# Patient Record
Sex: MALE | Race: BLACK OR AFRICAN AMERICAN | Employment: FULL TIME | URBAN - METROPOLITAN AREA
[De-identification: names, ages, dates, MRNs, and addresses within clinical notes are randomized per-mention and may not be internally consistent; named-entity substitution may affect disease eponyms.]

---

## 2017-07-21 ENCOUNTER — ED HISTORICAL/CONVERTED ENCOUNTER (OUTPATIENT)
Dept: OTHER | Age: 37
End: 2017-07-21

## 2017-11-21 ENCOUNTER — ED HISTORICAL/CONVERTED ENCOUNTER (OUTPATIENT)
Dept: OTHER | Age: 37
End: 2017-11-21

## 2018-03-17 ENCOUNTER — ED HISTORICAL/CONVERTED ENCOUNTER (OUTPATIENT)
Dept: OTHER | Age: 38
End: 2018-03-17

## 2018-10-31 ENCOUNTER — ED HISTORICAL/CONVERTED ENCOUNTER (OUTPATIENT)
Dept: OTHER | Age: 38
End: 2018-10-31

## 2019-02-11 ENCOUNTER — ED HISTORICAL/CONVERTED ENCOUNTER (OUTPATIENT)
Dept: OTHER | Age: 39
End: 2019-02-11

## 2019-04-22 ENCOUNTER — ED HISTORICAL/CONVERTED ENCOUNTER (OUTPATIENT)
Dept: OTHER | Age: 39
End: 2019-04-22

## 2020-05-07 ENCOUNTER — ED HISTORICAL/CONVERTED ENCOUNTER (OUTPATIENT)
Dept: OTHER | Age: 40
End: 2020-05-07

## 2021-08-24 ENCOUNTER — HOSPITAL ENCOUNTER (EMERGENCY)
Age: 41
Discharge: LEFT BEFORE DISCHARGE | End: 2021-08-24

## 2021-08-24 VITALS
TEMPERATURE: 97.7 F | OXYGEN SATURATION: 100 % | WEIGHT: 170 LBS | SYSTOLIC BLOOD PRESSURE: 108 MMHG | HEIGHT: 72 IN | HEART RATE: 76 BPM | BODY MASS INDEX: 23.03 KG/M2 | DIASTOLIC BLOOD PRESSURE: 73 MMHG | RESPIRATION RATE: 18 BRPM

## 2021-08-24 PROCEDURE — 75810000275 HC EMERGENCY DEPT VISIT NO LEVEL OF CARE

## 2021-08-24 NOTE — ED TRIAGE NOTES
X 30 days, has stye to left eye lid, 7/10 discomfort, not scratching but is draining for 30 days, talked to nurse was told to put warm compress on it. No crust noted. No other comlaints and used clear eye drops, helped, just because of swelling hard to see.

## 2022-01-23 ENCOUNTER — HOSPITAL ENCOUNTER (EMERGENCY)
Age: 42
Discharge: LEFT BEFORE DISCHARGE | End: 2022-01-23

## 2022-01-23 VITALS
SYSTOLIC BLOOD PRESSURE: 107 MMHG | RESPIRATION RATE: 16 BRPM | HEIGHT: 72 IN | HEART RATE: 60 BPM | OXYGEN SATURATION: 98 % | TEMPERATURE: 97.4 F | DIASTOLIC BLOOD PRESSURE: 71 MMHG | BODY MASS INDEX: 23.7 KG/M2 | WEIGHT: 175 LBS

## 2022-01-23 PROCEDURE — 75810000275 HC EMERGENCY DEPT VISIT NO LEVEL OF CARE

## 2022-01-23 NOTE — ED TRIAGE NOTES
GCS 15 pt stated that he has stye to his left upper eye lid; pt stated that he has had it for the last 3 days; pt also stated that recently he has been having a cough and body aches

## 2022-09-12 ENCOUNTER — HOSPITAL ENCOUNTER (EMERGENCY)
Age: 42
Discharge: HOME | End: 2022-09-12
Attending: STUDENT IN AN ORGANIZED HEALTH CARE EDUCATION/TRAINING PROGRAM

## 2022-09-12 VITALS
SYSTOLIC BLOOD PRESSURE: 117 MMHG | WEIGHT: 173 LBS | RESPIRATION RATE: 19 BRPM | BODY MASS INDEX: 23.43 KG/M2 | DIASTOLIC BLOOD PRESSURE: 76 MMHG | HEIGHT: 72 IN | HEART RATE: 74 BPM | OXYGEN SATURATION: 98 % | TEMPERATURE: 98.5 F

## 2022-09-12 DIAGNOSIS — H00.019 HORDEOLUM EXTERNUM, UNSPECIFIED LATERALITY: Primary | ICD-10-CM

## 2022-09-12 PROCEDURE — 99282 EMERGENCY DEPT VISIT SF MDM: CPT

## 2022-09-12 NOTE — ED PROVIDER NOTES
[facility]  EMERGENCY DEPARTMENT ENCOUNTER NOTE        Date: 9/12/2022  Patient Name: Christian Lemus      History of Presenting Illness     Chief Complaint   Patient presents with    Eye Pain       History Provided By: Patient    HPI: Cinthya Sharpe, 37 y.o. male with no chronic PMH of note who presents to the ED with several days history of stye on the left eye. Constant, nothing specific makes it better or worse tried using warm compressions twice a day for the last couple of days without significant provement. Mild irritation without any changes in vision. He did have something similar on the left eye this been going on for 9 months. He has not followed up with ophthalmology to get this issue addressed. Hogan, patient reports that he has been his normal self and does not have any complaints. Presenting issue is of mild severity, no known aggravating leaving factors no association symptoms. There are no other complaints, changes, or physical findings at this time. PCP: None        Past History     Past Medical History:  History reviewed. No pertinent past medical history. Past Surgical History:  History reviewed. No pertinent surgical history. Family History:  History reviewed. No pertinent family history. Social History:  Social History     Tobacco Use    Smoking status: Some Days     Packs/day: 0.25     Types: Cigarettes    Smokeless tobacco: Never   Vaping Use    Vaping Use: Never used   Substance Use Topics    Alcohol use: Yes     Comment: on occasion     Drug use: Not Currently       Allergies:  No Known Allergies      Review of Systems     Review of Systems    A 10 point review of system was performed and was negative except as noted above in HPI    Physical Exam     Physical Exam  Vitals and nursing note reviewed. Constitutional:       General: He is not in acute distress. Appearance: He is not toxic-appearing.    HENT:      Head: Normocephalic and atraumatic. Eyes:      General: No scleral icterus. Right eye: No discharge. Left eye: No discharge. Extraocular Movements: Extraocular movements intact. Conjunctiva/sclera: Conjunctivae normal.      Comments: Stye noted on the left upper eyelid  Stye noted on the right lower eyelid   Cardiovascular:      Rate and Rhythm: Normal rate and regular rhythm. Pulmonary:      Effort: Pulmonary effort is normal. No respiratory distress. Musculoskeletal:         General: No deformity or signs of injury. Neurological:      Mental Status: He is alert and oriented to person, place, and time. Psychiatric:         Mood and Affect: Mood normal.         Behavior: Behavior normal.       Lab and Diagnostic Study Results     Labs -   No results found for this or any previous visit (from the past 12 hour(s)). Radiologic Studies -   [unfilled]  CT Results  (Last 48 hours)      None          CXR Results  (Last 48 hours)      None            Medical Decision Making and ED Course   - I am the first and primary provider for this patient AND AM THE PRIMARY PROVIDER OF RECORD. - I reviewed the vital signs, available nursing notes, past medical history, past surgical history, family history and social history. - Initial assessment performed. The patients presenting problems have been discussed, and the staff are in agreement with the care plan formulated and outlined with them. I have encouraged them to ask questions as they arise throughout their visit. Vital Signs-Reviewed the patient's vital signs. Patient Vitals for the past 24 hrs:   Temp Pulse Resp BP SpO2   09/12/22 1248 98.5 °F (36.9 °C) 74 19 117/76 98 %       Records Reviewed: Nursing Notes    Provider Notes (Medical Decision Making):     81-year-old gentleman with no chronic PMH of note presents to the ED with chronic stye of the left and acute stye on the right eye.   Vision is within normal.  Conjunctive is within normal.  No drainage or discharge noted. No periorbital swelling that would be concerning for an infectious process. Normal extraocular movement as well. Symptomatic treatment with warm compressions and NSAIDs as needed. Follow-up with ophthalmology as appropriate. ED evaluation, clinical impression disposition discussed with patient and he verbalized understanding. He will be able to arrange necessary follow-up. Instructed come back to the ED if symptoms evolve or develop new concerns. Diagnosis     Clinical Impression:   1. Hordeolum externum, unspecified laterality        Disposition     Disposition: Condition stable  DC- Adult Discharges: All of the diagnostic tests were reviewed and questions answered. Diagnosis, care plan and treatment options were discussed. The patient understands the instructions and will follow up as directed. The patients results have been reviewed with them. They have been counseled regarding their diagnosis. The patient verbally convey understanding and agreement of the signs, symptoms, diagnosis, treatment and prognosis and additionally agrees to follow up as recommended with their PCP in 24 - 48 hours. They also agree with the care-plan and convey that all of their questions have been answered. I have also put together some discharge instructions for them that include: 1) educational information regarding their diagnosis, 2) how to care for their diagnosis at home, as well a 3) list of reasons why they would want to return to the ED prior to their follow-up appointment, should their condition change. Discharged      DISCHARGE PLAN:  1.    Follow-up Information       Follow up With Specialties Details Why Dasia Lisayuliana Finch  Schedule an appointment as soon as possible for a visit in 3 days For reevaluation, Discuss your visit to the ER [address] [place] [address] [address]    [address] [place] [address] DEPT Emergency Medicine Go to  As needed, If symptoms worsen [address] [address] [address]  [phone]          2. Return to ED if worse   3. There are no discharge medications for this patient. Attestations: Lauren Daugherty MD    Please note that this dictation was completed with [employer], the computer voice recognition software. Quite often unanticipated grammatical, syntax, homophones, and other interpretive errors are inadvertently transcribed by the computer software. Please disregard these errors. Please excuse any errors that have escaped final proofreading. Thank you.

## 2022-09-12 NOTE — ED TRIAGE NOTES
Pt reports that he has a sty on both eyes. Left one has been there the longest and right recently came up. . now causing pain and irritation.

## 2024-11-04 ENCOUNTER — HOSPITAL ENCOUNTER (EMERGENCY)
Facility: HOSPITAL | Age: 44
Discharge: HOME | End: 2024-11-04

## 2024-11-04 ENCOUNTER — APPOINTMENT (OUTPATIENT)
Facility: HOSPITAL | Age: 44
End: 2024-11-04

## 2024-11-04 VITALS
DIASTOLIC BLOOD PRESSURE: 78 MMHG | WEIGHT: 171 LBS | TEMPERATURE: 98 F | HEIGHT: 72 IN | RESPIRATION RATE: 16 BRPM | HEART RATE: 58 BPM | BODY MASS INDEX: 23.16 KG/M2 | SYSTOLIC BLOOD PRESSURE: 112 MMHG | OXYGEN SATURATION: 100 %

## 2024-11-04 DIAGNOSIS — M25.561 ACUTE PAIN OF RIGHT KNEE: Primary | ICD-10-CM

## 2024-11-04 PROCEDURE — 73562 X-RAY EXAM OF KNEE 3: CPT

## 2024-11-04 PROCEDURE — 99283 EMERGENCY DEPT VISIT LOW MDM: CPT

## 2024-11-04 RX ORDER — TRAMADOL HYDROCHLORIDE 50 MG/1
50 TABLET ORAL EVERY 6 HOURS PRN
Qty: 12 TABLET | Refills: 0 | Status: SHIPPED | OUTPATIENT
Start: 2024-11-04 | End: 2024-11-07

## 2024-11-04 RX ORDER — IBUPROFEN 600 MG/1
600 TABLET, FILM COATED ORAL EVERY 6 HOURS PRN
Qty: 20 TABLET | Refills: 0 | Status: SHIPPED | OUTPATIENT
Start: 2024-11-04

## 2024-11-04 ASSESSMENT — LIFESTYLE VARIABLES
HOW OFTEN DO YOU HAVE A DRINK CONTAINING ALCOHOL: NEVER
HOW MANY STANDARD DRINKS CONTAINING ALCOHOL DO YOU HAVE ON A TYPICAL DAY: PATIENT DOES NOT DRINK

## 2024-11-04 ASSESSMENT — PAIN DESCRIPTION - LOCATION: LOCATION: KNEE

## 2024-11-04 ASSESSMENT — PAIN SCALES - GENERAL
PAINLEVEL_OUTOF10: 7
PAINLEVEL_OUTOF10: 0

## 2024-11-04 ASSESSMENT — PAIN DESCRIPTION - ORIENTATION: ORIENTATION: RIGHT

## 2024-11-04 ASSESSMENT — PAIN - FUNCTIONAL ASSESSMENT: PAIN_FUNCTIONAL_ASSESSMENT: 0-10

## 2024-11-04 NOTE — ED TRIAGE NOTES
Arrives to ED with complaints of R knee pain x past several days, states had a fall a few days ago and fell onto knee, knee pain ever since the incident. GCS 15, ambulatory

## 2024-11-04 NOTE — ED PROVIDER NOTES
[facility] EMERGENCY DEPT  EMERGENCY DEPARTMENT HISTORY AND PHYSICAL EXAM      Date: 11/4/2024  Patient Name: Conrad Bhatt  MRN: [id number]  YOB: 1980  Date of evaluation: 11/4/2024  Provider: JAMES Mosqueda NP   Note Started: 2:10 PM EST 11/4/24    HISTORY OF PRESENT ILLNESS     Chief Complaint   Patient presents with   • Knee Pain       History Provided By: Patient    HPI: Conrad Bhatt is a 43 y.o. male ***    PAST MEDICAL HISTORY   Past Medical History:  History reviewed. No pertinent past medical history.    Past Surgical History:  History reviewed. No pertinent surgical history.    Family History:  History reviewed. No pertinent family history.    Social History:  Social History     Tobacco Use   • Smoking status: Some Days     Current packs/day: 0.25     Types: Cigarettes   • Smokeless tobacco: Never   Substance Use Topics   • Alcohol use: Yes   • Drug use: Not Currently       Allergies:  No Known Allergies    PCP: None, None    Current Meds:   No current facility-administered medications for this encounter.     No current outpatient medications on file.       Social Determinants of Health:   Social Determinants of Health     Tobacco Use: High Risk (11/4/2024)    Patient History    • Smoking Tobacco Use: Some Days    • Smokeless Tobacco Use: Never    • Passive Exposure: Not on file   Alcohol Use: Not At Risk (11/4/2024)    AUDIT-C    • Frequency of Alcohol Consumption: Never    • Average Number of Drinks: Patient does not drink    • Frequency of Binge Drinking: Never   Financial Resource Strain: Not on file   Food Insecurity: Not on file   Transportation Needs: Not on file   Physical Activity: Not on file   Stress: Not on file   Social Connections: Not on file   Intimate Partner Violence: Not on file   Depression: Not on file   Housing Stability: Not on file   Interpersonal Safety: Not At Risk (11/4/2024)    Interpersonal Safety Domain Source: IP Abuse Screening    • Physical abuse: Denies  instructions have been discussed, understanding conveyed, and agreed upon. The patient is to follow up as recommended or return to ER should their symptoms worsen.      PATIENT REFERRED TO:  Maame Womack MD  [address] [place], [place] [address] [phone]              DISCHARGE MEDICATIONS:     Medication List        START taking these medications      ibuprofen 600 MG tablet  Commonly known as: IBU  Take 1 tablet by mouth every 6 hours as needed for Pain            ASK your doctor about these medications      traMADol 50 MG tablet  Commonly known as: ULTRAM  Take 1 tablet by mouth every 6 hours as needed for Pain for up to 3 days. Max Daily Amount: 200 mg  Ask about: Should I take this medication?               Where to Get Your Medications        These medications were sent to [facility]/pharmacy #[facility] - [place], [place] - [address] - P [phone] - F [phone]  [address] [id number]      Phone: [phone]   ibuprofen 600 MG tablet  traMADol 50 MG tablet           DISCONTINUED MEDICATIONS:  Discharge Medication List as of 11/4/2024  2:38 PM          I am the Primary Clinician of Record: JAMES Mosqueda NP (electronically signed)    (Please note that parts of this dictation were completed with voice recognition software. Quite often unanticipated grammatical, syntax, homophones, and other interpretive errors are inadvertently transcribed by the computer software. Please disregards these errors. Please excuse any errors that have escaped final proofreading.)     Néstor Scherer APRN - NP  11/12/24 0116

## 2024-12-09 ENCOUNTER — HOSPITAL ENCOUNTER (EMERGENCY)
Facility: HOSPITAL | Age: 44
Discharge: HOME | End: 2024-12-09

## 2024-12-09 VITALS
SYSTOLIC BLOOD PRESSURE: 120 MMHG | TEMPERATURE: 98 F | BODY MASS INDEX: 23.3 KG/M2 | WEIGHT: 172 LBS | RESPIRATION RATE: 18 BRPM | HEIGHT: 72 IN | HEART RATE: 71 BPM | OXYGEN SATURATION: 99 % | DIASTOLIC BLOOD PRESSURE: 84 MMHG
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
## 2024-12-09 PROCEDURE — 6370000000 HC RX 637 (ALT 250 FOR IP): Performed by: PHYSICIAN ASSISTANT

## 2024-12-09 PROCEDURE — 99283 EMERGENCY DEPT VISIT LOW MDM: CPT

## 2024-12-09 RX ORDER — DICLOFENAC SODIUM 75 MG/1
75 TABLET, DELAYED RELEASE ORAL 2 TIMES DAILY
Qty: 20 TABLET | Refills: 0 | Status: SHIPPED | OUTPATIENT
Start: 2024-12-09

## 2024-12-09 RX ORDER — BACITRACIN ZINC 500 [USP'U]/G
OINTMENT TOPICAL
Status: COMPLETED | OUTPATIENT
Start: 2024-12-09 | End: 2024-12-09

## 2024-12-09 RX ADMIN — BACITRACIN ZINC: 500 OINTMENT TOPICAL at 02:14

## 2024-12-09 ASSESSMENT — PAIN SCALES - GENERAL
PAINLEVEL_OUTOF10: 6
PAINLEVEL_OUTOF10: 10

## 2024-12-09 ASSESSMENT — PAIN - FUNCTIONAL ASSESSMENT
PAIN_FUNCTIONAL_ASSESSMENT: 0-10
PAIN_FUNCTIONAL_ASSESSMENT: 0-10

## 2024-12-09 NOTE — ED PROVIDER NOTES
0100   BP: 118/88   Pulse: 72   Resp: 18   Temp: 97.9 °F (36.6 °C)   TempSrc: Oral   SpO2: 98%   Weight: 78 kg (172 lb)   Height: 1.829 m (6')        ED COURSE  1:59 AM  Patient reassessed and updated on plan.  States that pain is completely resolved.  Ring successfully removed without complication.  Patient encouraged to follow up with his PCP as needed, or return to ED sooner if worse.  Patient also educated on strict return precautions and conveys good understanding and agreement with care plan as outlined.  They have no additional complaints or concerns and all of their questions have been answered.  Stable for discharge home.       Routine discharge counseling was given including the fact that any worsening, changing or persistent symptoms should prompt an immediate call or follow up with their primary physician or the emergency department. The importance of appropriate follow up was also discussed. More extensive discharge instructions were given in the patient's discharge paperwork.     After completion of evaluation and discussion of results and diagnoses, all the questions were answered. If required, all follow up appointments and treatments were discussed and explained. Understanding was insured prior to discharge.         SEPSIS Reassessment: Sepsis reassessment not applicable      Patient was given the following medications:  Medications   bacitracin zinc ointment (has no administration in time range)       CONSULTS: See ED Course/MDM for further details.  None     Social Determinants affecting Diagnosis/Treatment: Patient lacks a PCP. Given PCP/Free clinic resources.    Smoking Cessation: Smoking Cessation Counseling  Discussed the risks of smoking tobacco products and the long term sequelae of tobacco use with the patient such as increased risk of heart attack, stroke, peripheral artery disease and cancer. The patient verbalized their understanding and were provided resources if asked. Counseled  medications      ibuprofen 600 MG tablet  Commonly known as: IBU               Where to Get Your Medications        These medications were sent to [facility]/pharmacy #[id number] - [place], [place] - [address] - P [phone] - F [phone]  [address] [address]      Phone: [phone]   diclofenac 75 MG EC tablet           DISCONTINUED MEDICATIONS:  Discharge Medication List as of 12/9/2024  2:01 AM          I am the Primary Clinician of Record: Dallas New PA-C (electronically signed)    (Please note that parts of this dictation were completed with voice recognition software. Quite often unanticipated grammatical, syntax, homophones, and other interpretive errors are inadvertently transcribed by the computer software. Please disregards these errors. Please excuse any errors that have escaped final proofreading.)     Dallas New PA-C  12/16/24 0853

## 2024-12-09 NOTE — DISCHARGE INSTRUCTIONS
PRIMARY CARE in [facility]:  [address] [place] [address].  [phone]  Jenna Lea MD Family Medicine  Henry La MD Family Medicine  Dominik Leung MD Family Medicine    [facility] Family Medicine: [address] [place] [address]. [phone]   Kareem Casas MD Family Medicine  Laney Powers, ANISHA Family Medicine  Nicole Brooks, ANISHA Family Medicine    [facility] Family Medicine: [address], [address], [place] [address]. [phone]  Aida Mendoza MD Family Medicine  Lavonne Daniels MD Family Medicine  Emre Brooke, ANISHA Family Medicine  More Brennan, ANISHA Family Medicine    [facility]: [address], [address] [facility] [address]. [phone]  Maricruz Mcwilliams MD Internal Medicine  Laith Govea MD Internal Medicine  Tony Lee MD Internal Medicine  Radha Ponce, PA Family Medicine    [facility] Family Practice: [address] [address]. [phone]  Zoila Victoria MD Family Medicine  Tamia Bright MD Family Medicine  Dave Askew, DO Infectious Disease  Chong Johns MD Family Medicine    [facility] Medicine: [address], [address], [place] [address]. [phone]  Dea Antonio,  Family Medicine  Chyna Brooke NP Internal Medicine  Rowan Brooks, ANISHA Internal Medicine    [place] [facility]: [address] [place], [place] [address]. [phone]  Tanesha Shah MD Internal Medicine  Cortney Carnes MD Internal Medicine    Primary Care [facility] Practice: [address] [place] [address]. [phone]  Leny Marte MD Family Medicine  Mary Yang MD Family Medicine  Kendrick Singh MD Family Medicine  Marie Lopez, ANISHA Family Medicine  James Brooke, ARPN, CNP Family Medicine    [facility]  MD    Independent Practice  Roberth Sarmiento MD: [address], [place] [address]. [phone]  Audie Medley MD: [address] [facility]. [phone]

## 2025-07-24 ENCOUNTER — HOSPITAL ENCOUNTER (EMERGENCY)
Facility: HOSPITAL | Age: 45
Discharge: HOME | End: 2025-07-24
Attending: STUDENT IN AN ORGANIZED HEALTH CARE EDUCATION/TRAINING PROGRAM

## 2025-07-24 ENCOUNTER — APPOINTMENT (OUTPATIENT)
Facility: HOSPITAL | Age: 45
End: 2025-07-24

## 2025-07-24 VITALS
DIASTOLIC BLOOD PRESSURE: 79 MMHG | HEART RATE: 58 BPM | TEMPERATURE: 97.8 F | SYSTOLIC BLOOD PRESSURE: 116 MMHG | WEIGHT: 175 LBS | HEIGHT: 72 IN | RESPIRATION RATE: 17 BRPM | OXYGEN SATURATION: 100 % | BODY MASS INDEX: 23.7 KG/M2

## 2025-07-24 DIAGNOSIS — R09.1 PLEURISY: Primary | ICD-10-CM

## 2025-07-24 DIAGNOSIS — R10.9 FLANK PAIN: ICD-10-CM

## 2025-07-24 PROCEDURE — 6360000002 HC RX W HCPCS: Performed by: STUDENT IN AN ORGANIZED HEALTH CARE EDUCATION/TRAINING PROGRAM

## 2025-07-24 PROCEDURE — 96372 THER/PROPH/DIAG INJ SC/IM: CPT

## 2025-07-24 PROCEDURE — 71046 X-RAY EXAM CHEST 2 VIEWS: CPT

## 2025-07-24 PROCEDURE — 99284 EMERGENCY DEPT VISIT MOD MDM: CPT

## 2025-07-24 RX ORDER — IBUPROFEN 600 MG/1
600 TABLET, FILM COATED ORAL 3 TIMES DAILY PRN
Qty: 30 TABLET | Refills: 0 | Status: SHIPPED | OUTPATIENT
Start: 2025-07-24

## 2025-07-24 RX ORDER — KETOROLAC TROMETHAMINE 30 MG/ML
30 INJECTION, SOLUTION INTRAMUSCULAR; INTRAVENOUS
Status: COMPLETED | OUTPATIENT
Start: 2025-07-24 | End: 2025-07-24

## 2025-07-24 RX ORDER — CYCLOBENZAPRINE HCL 10 MG
10 TABLET ORAL NIGHTLY PRN
Qty: 30 TABLET | Refills: 0 | Status: SHIPPED | OUTPATIENT
Start: 2025-07-24 | End: 2025-08-03

## 2025-07-24 RX ADMIN — KETOROLAC TROMETHAMINE 30 MG: 30 INJECTION, SOLUTION INTRAMUSCULAR at 07:59

## 2025-07-24 ASSESSMENT — PAIN - FUNCTIONAL ASSESSMENT: PAIN_FUNCTIONAL_ASSESSMENT: 0-10

## 2025-07-24 ASSESSMENT — PAIN DESCRIPTION - PAIN TYPE: TYPE: ACUTE PAIN

## 2025-07-24 ASSESSMENT — PAIN SCALES - GENERAL
PAINLEVEL_OUTOF10: 9
PAINLEVEL_OUTOF10: 6
PAINLEVEL_OUTOF10: 10

## 2025-07-24 NOTE — ED TRIAGE NOTES
GCS 15 pt stated that this am ei woke up around 0600 today he had bilateral side pain; denies SOB or trauma

## 2025-07-24 NOTE — DISCHARGE INSTRUCTIONS
PRIMARY CARE in [facility] Practice Center:  [address] [place] [address].  [phone]  Jenna Lea MD Family Medicine  Henry La MD Family Medicine  Dominik Leung MD Family Medicine    [facility] Family Medicine: [address] [place] [address]. [phone]   Kareem Casas MD Family Medicine  Laney Powers, ANISHA Family Medicine  Nicole Brooks, ANISHA Family Medicine    [facility] Medicine: [address], [address], [place] [address]. [phone]  Aida Mendoza MD Family Medicine  Lavonne Daniels MD Family Medicine  Emre Brooke, ANISHA Family Medicine  More Brennan, ANISHA Family Medicine    [facility]: [address] [address]. [phone]  Maricruz Mcwilliams MD Internal Medicine  Laith Govea MD Internal Medicine  Tony Lee MD Internal Medicine  Radha Ponce, PA Family Medicine    [facility] Family Practice: [address] [address]. [phone]  Zoila Victoria MD Family Medicine  Tamia Bright MD Family Medicine  Dave Askew, DO Infectious Disease  Chong Johns MD Family Medicine    [facility] Medicine: [address], [address], [place] [address]. [phone]  Dea Antonio,  Family Medicine  Chyna Brooke NP Internal Medicine  Rowan Brooks, ANISHA Internal Medicine    [place] [facility] [place] [facility]: [address] [place], [place] [address]. [phone]  Tanesha Shah MD Internal Medicine  Cortney Carnes MD Internal Medicine    Primary Care [facility] Practice: [address] [place] [address]. [phone]  Leny Marte MD Family Medicine  Mary Yang MD Family Medicine  Kendrick Singh MD Family Medicine  Marie Lopez, ANISHA Family Medicine  James Brooke, ARPN, CNP Family Medicine    [facility]

## 2025-07-24 NOTE — ED PROVIDER NOTES
[facility] EMERGENCY DEPT  EMERGENCY DEPARTMENT HISTORY AND PHYSICAL EXAM      Date of evaluation: 7/24/2025  Patient Name: Conrad Bhatt  Birthdate 1980  MRN: [id number]  ED Provider: Vladimir Caraballo MD   Note Started: 10:54 AM EDT 7/24/25    HISTORY OF PRESENT ILLNESS     Chief Complaint   Patient presents with    Rib Pain       History Provided By: Patient, only     HPI: Conrad Bhatt is a 44 y.o. male presents to Emergency Department for bilateral back/flank pain.  Patient states that over the last 24 hours he has had worsening pain to his flank/rib area, worse with deep inspiration worse with any twisting denies any recent heavy lifting denies any fevers chills, cough or shortness of breath    PAST MEDICAL HISTORY   Past Medical History:  No past medical history on file.    Past Surgical History:  No past surgical history on file.    Family History:  No family history on file.    Social History:  Social History     Tobacco Use    Smoking status: Some Days     Current packs/day: 0.25     Types: Cigarettes    Smokeless tobacco: Never   Substance Use Topics    Alcohol use: Yes    Drug use: Not Currently       Allergies:  No Known Allergies    PCP: None, None    Current Meds:   No current facility-administered medications for this encounter.     Current Outpatient Medications   Medication Sig Dispense Refill    ibuprofen (ADVIL;MOTRIN) 600 MG tablet Take 1 tablet by mouth 3 times daily as needed for Pain 30 tablet 0    cyclobenzaprine (FLEXERIL) 10 MG tablet Take 1 tablet by mouth nightly as needed for Muscle spasms 30 tablet 0    diclofenac (VOLTAREN) 75 MG EC tablet Take 1 tablet by mouth 2 times daily (Patient not taking: Reported on 7/24/2025) 20 tablet 0       Social Determinants of Health:   Social Drivers of Health     Tobacco Use: High Risk (12/9/2024)    Patient History     Smoking Tobacco Use: Some Days     Smokeless Tobacco Use: Never     Passive Exposure: Not on file   Alcohol Use: Not At Risk